# Patient Record
Sex: FEMALE | Race: WHITE | HISPANIC OR LATINO | Employment: UNEMPLOYED | URBAN - METROPOLITAN AREA
[De-identification: names, ages, dates, MRNs, and addresses within clinical notes are randomized per-mention and may not be internally consistent; named-entity substitution may affect disease eponyms.]

---

## 2020-06-10 ENCOUNTER — HOSPITAL ENCOUNTER (EMERGENCY)
Facility: HOSPITAL | Age: 50
Discharge: HOME | End: 2020-06-10
Attending: EMERGENCY MEDICINE
Payer: MEDICAID

## 2020-06-10 VITALS
OXYGEN SATURATION: 98 % | SYSTOLIC BLOOD PRESSURE: 128 MMHG | DIASTOLIC BLOOD PRESSURE: 74 MMHG | HEIGHT: 64 IN | HEART RATE: 76 BPM | RESPIRATION RATE: 18 BRPM | BODY MASS INDEX: 29.02 KG/M2 | WEIGHT: 170 LBS | TEMPERATURE: 98 F

## 2020-06-10 DIAGNOSIS — M79.603 ARM PAIN: ICD-10-CM

## 2020-06-10 DIAGNOSIS — M79.602 LEFT ARM PAIN: ICD-10-CM

## 2020-06-10 DIAGNOSIS — M54.2 CERVICAL PAIN: Primary | ICD-10-CM

## 2020-06-10 LAB
ALBUMIN SERPL-MCNC: 3.8 G/DL (ref 3.3–5.5)
ALP SERPL-CCNC: 78 U/L (ref 42–141)
BILIRUB SERPL-MCNC: 0.5 MG/DL (ref 0.2–1.6)
BUN SERPL-MCNC: 10 MG/DL (ref 7–22)
CALCIUM SERPL-MCNC: 10.4 MG/DL (ref 8–10.3)
CHLORIDE SERPL-SCNC: 105 MMOL/L (ref 98–108)
CREAT SERPL-MCNC: 0.7 MG/DL (ref 0.6–1.2)
GLUCOSE SERPL-MCNC: 106 MG/DL (ref 73–118)
POC ALT (SGPT): 42 U/L (ref 10–47)
POC AST (SGOT): 34 U/L (ref 11–38)
POC B-TYPE NATRIURETIC PEPTIDE: 17.5 PG/ML (ref 0–100)
POC CARDIAC TROPONIN I: 0 NG/ML
POC TCO2: 28 MMOL/L (ref 18–33)
POTASSIUM BLD-SCNC: 4 MMOL/L (ref 3.6–5.1)
PROTEIN, POC: 8.6 G/DL (ref 6.4–8.1)
SAMPLE: NORMAL
SODIUM BLD-SCNC: 143 MMOL/L (ref 128–145)

## 2020-06-10 PROCEDURE — 63600175 PHARM REV CODE 636 W HCPCS: Mod: ER | Performed by: NURSE PRACTITIONER

## 2020-06-10 PROCEDURE — 83880 ASSAY OF NATRIURETIC PEPTIDE: CPT | Mod: ER

## 2020-06-10 PROCEDURE — 93010 EKG 12-LEAD: ICD-10-PCS | Mod: ,,, | Performed by: INTERNAL MEDICINE

## 2020-06-10 PROCEDURE — 25000003 PHARM REV CODE 250: Mod: ER | Performed by: NURSE PRACTITIONER

## 2020-06-10 PROCEDURE — 96372 THER/PROPH/DIAG INJ SC/IM: CPT | Mod: ER,59

## 2020-06-10 PROCEDURE — 96374 THER/PROPH/DIAG INJ IV PUSH: CPT | Mod: ER

## 2020-06-10 PROCEDURE — 85025 COMPLETE CBC W/AUTO DIFF WBC: CPT | Mod: ER

## 2020-06-10 PROCEDURE — 80053 COMPREHEN METABOLIC PANEL: CPT | Mod: ER

## 2020-06-10 PROCEDURE — 99285 EMERGENCY DEPT VISIT HI MDM: CPT | Mod: 25,ER

## 2020-06-10 PROCEDURE — 93010 ELECTROCARDIOGRAM REPORT: CPT | Mod: ,,, | Performed by: INTERNAL MEDICINE

## 2020-06-10 PROCEDURE — 93005 ELECTROCARDIOGRAM TRACING: CPT | Mod: ER

## 2020-06-10 PROCEDURE — 84484 ASSAY OF TROPONIN QUANT: CPT | Mod: ER

## 2020-06-10 RX ORDER — KETOROLAC TROMETHAMINE 30 MG/ML
15 INJECTION, SOLUTION INTRAMUSCULAR; INTRAVENOUS
Status: COMPLETED | OUTPATIENT
Start: 2020-06-10 | End: 2020-06-10

## 2020-06-10 RX ORDER — ASPIRIN 325 MG
325 TABLET ORAL
Status: COMPLETED | OUTPATIENT
Start: 2020-06-10 | End: 2020-06-10

## 2020-06-10 RX ORDER — DICLOFENAC SODIUM 50 MG/1
50 TABLET, DELAYED RELEASE ORAL 3 TIMES DAILY PRN
Qty: 24 TABLET | Refills: 0 | Status: SHIPPED | OUTPATIENT
Start: 2020-06-10

## 2020-06-10 RX ORDER — METFORMIN HYDROCHLORIDE 500 MG/1
500 TABLET ORAL 2 TIMES DAILY WITH MEALS
COMMUNITY

## 2020-06-10 RX ORDER — ORPHENADRINE CITRATE 30 MG/ML
60 INJECTION INTRAMUSCULAR; INTRAVENOUS
Status: COMPLETED | OUTPATIENT
Start: 2020-06-10 | End: 2020-06-10

## 2020-06-10 RX ORDER — METHOCARBAMOL 500 MG/1
1000 TABLET, FILM COATED ORAL EVERY 6 HOURS PRN
Qty: 40 TABLET | Refills: 0 | Status: SHIPPED | OUTPATIENT
Start: 2020-06-10

## 2020-06-10 RX ADMIN — ORPHENADRINE CITRATE 60 MG: 30 INJECTION INTRAMUSCULAR; INTRAVENOUS at 07:06

## 2020-06-10 RX ADMIN — ASPIRIN 325 MG ORAL TABLET 325 MG: 325 PILL ORAL at 06:06

## 2020-06-10 RX ADMIN — KETOROLAC TROMETHAMINE 15 MG: 30 INJECTION, SOLUTION INTRAMUSCULAR at 07:06

## 2020-06-10 NOTE — ED TRIAGE NOTES
Pt to er c/o L upper arm pain that radiates to her shoulder and neck for 2 months--pain has gotten worse and pt reports that she cant lift her arm due to pain--denies trauma and numbness--tingling is present--pulses palpable

## 2020-06-10 NOTE — ED PROVIDER NOTES
Encounter Date: 6/10/2020    SCRIBE #1 NOTE: I, Nathan Mayer, am scribing for, and in the presence of,  GIANNI Altamirano. I have scribed the following portions of the note - Other sections scribed: HPI, ROS, PE.       History     Chief Complaint   Patient presents with    Arm Pain     Pt to ER with c/o left arm pain radiating under left breast. Pt reports + numbness to arm x 2 months. Pt denies injury or fall      This is a nontoxic appearing 50 y.o. female presenting to the ED with left arm pain x 2 months. Patient describes numbness and tingling. Pain is exacerbated with movement. Denies CP and SOB. Pt denies injury. Pt has a hx of DM. She took Motrin with no improvement.     The history is provided by the patient. A  was used (Sister ).   Arm Pain   This is a recurrent problem. The current episode started more than 1 week ago. Pertinent negatives include no chest pain and no shortness of breath. The symptoms are aggravated by bending and twisting. She has tried ASA for the symptoms.     Review of patient's allergies indicates:  No Known Allergies  No past medical history on file.  No past surgical history on file.  No family history on file.  Social History     Tobacco Use    Smoking status: Not on file   Substance Use Topics    Alcohol use: Not on file    Drug use: Not on file     Review of Systems   Constitutional: Negative.    HENT: Negative.    Eyes: Negative.    Respiratory: Negative.  Negative for shortness of breath.    Cardiovascular: Negative.  Negative for chest pain.   Gastrointestinal: Negative.    Endocrine: Negative.    Genitourinary: Negative.    Musculoskeletal: Positive for arthralgias (left arm pain).   Skin: Negative.    Allergic/Immunologic: Negative.    Neurological: Positive for numbness.        + Tingling   Hematological: Negative.    Psychiatric/Behavioral: Negative.    All other systems reviewed and are negative.      Physical Exam     Initial Vitals [06/10/20 1713]    BP Pulse Resp Temp SpO2   (!) 156/88 85 18 97.3 °F (36.3 °C) 97 %      MAP       --         Physical Exam    Nursing note and vitals reviewed.  Constitutional: She appears well-developed.   HENT:   Right Ear: External ear normal.   Left Ear: External ear normal.   Nose: Nose normal.   Mouth/Throat: Oropharynx is clear and moist.   Eyes: Conjunctivae are normal.   Neck: Normal range of motion. Neck supple. Muscular tenderness present. No spinous process tenderness present.       Cardiovascular: Normal rate, regular rhythm, S1 normal, S2 normal and normal heart sounds.   Pulses:       Dorsalis pedis pulses are 2+ on the right side, and 2+ on the left side.   Pulmonary/Chest: Effort normal and breath sounds normal.   Abdominal: Soft. Bowel sounds are normal. She exhibits no distension.   Musculoskeletal: She exhibits no edema.        Left shoulder: She exhibits decreased range of motion and tenderness. She exhibits no swelling and no deformity.        Arms:  Neurological: She is alert and oriented to person, place, and time.   Skin: Skin is warm and dry.   Psychiatric: She has a normal mood and affect. Her behavior is normal.         ED Course   Procedures  Labs Reviewed - No data to display  EKG Readings: (Independently Interpreted)   Initial Reading: No STEMI. Rhythm: Normal Sinus Rhythm. Heart Rate: 67. Ectopy: No Ectopy. Conduction: Normal. Axis: Normal. Clinical Impression: Normal Sinus Rhythm Other Impression: Normal EKG   Normal sinus rhythm       Imaging Results    None        Imaging Results          X-Ray Shoulder 2 or More Views Left (Final result)  Result time 06/10/20 19:03:32    Final result by Sonny Aiken MD (06/10/20 19:03:32)                 Impression:      No acute process.      Electronically signed by: Sonny Aiken MD  Date:    06/10/2020  Time:    19:03             Narrative:    EXAMINATION:  XR SHOULDER COMPLETE 2 OR MORE VIEWS LEFT    CLINICAL HISTORY:  pain;    TECHNIQUE:  Two or three views  of the left shoulder were performed.    COMPARISON:  None    FINDINGS:  The bone mineralization is within normal limits.  There is no cortical step-off.  There is no evidence of periostitis.    The glenohumeral articulation is maintained.  The acromioclavicular joint is within normal limits.  The coracoclavicular interval is unremarkable.    The visualized left hemithorax is within normal limits.  There is no evidence of a pneumothorax.  There is no evidence of pulmonary contusion.    There is no evidence of acute fracture or dislocation of the left shoulder.                               X-Ray Cervical Spine AP And Lateral (Final result)  Result time 06/10/20 19:07:08    Final result by Sonny Aiken MD (06/10/20 19:07:08)                 Impression:      No evidence of acute fracture or listhesis of the cervical spine.    Mild degenerative changes in the cervical spine.  Additional evaluation, as clinically warranted.      Electronically signed by: Sonny Aiken MD  Date:    06/10/2020  Time:    19:07             Narrative:    EXAMINATION:  XR CERVICAL SPINE AP LATERAL    CLINICAL HISTORY:  pain;    TECHNIQUE:  AP, lateral and open mouth views of the cervical spine were performed.    COMPARISON:  None.    FINDINGS:  The craniocervical junction is unremarkable.  The predental space is maintained.  No prevertebral soft tissue swelling is identified.    The cervical alignment is maintained.  The vertebral body heights are maintained.  There is minimal hypertrophy of the posterior elements.  The lateral masses of C1 are poorly visualized.  There is no evidence of acute fracture or listhesis of the cervical spine.    The paraspinal soft tissues are within normal limits.                               X-Ray Chest PA And Lateral (Final result)  Result time 06/10/20 19:05:13   Procedure changed from X-Ray Chest AP Portable     Final result by Sonny Aiken MD (06/10/20 19:05:13)                 Impression:      No acute  process.      Electronically signed by: Sonny Aiken MD  Date:    06/10/2020  Time:    19:05             Narrative:    EXAMINATION:  XR CHEST PA AND LATERAL    CLINICAL HISTORY:  pain; Pain in arm, unspecified    TECHNIQUE:  PA and lateral views of the chest were performed.    COMPARISON:  None    FINDINGS:  The trachea is unremarkable.  The cardiomediastinal silhouette is unremarkable.  The hilar structures are within normal limits.  The hemidiaphragms are unremarkable.  There is no evidence of free air beneath the hemidiaphragms.  There are no pleural effusions.  There is no evidence of a pneumothorax.  There is no evidence of pneumomediastinum.  No airspace opacity is present.  The osseous structures are unremarkable.                                  Medical Decision Making:   History:   Old Medical Records: I decided to obtain old medical records.  Initial Assessment:   This is a nontoxic appearing 50 y.o. female presenting to the ED with left arm pain x 2 months. Denies CP and SOB. Denies injury.   Differential Diagnosis:   Arm sprain, rotator cuff tendinitis, cervical radiculopathy  Clinical Tests:   Lab Tests: Ordered and Reviewed  ED Management:  Physical exam.  EKG normal sinus rhythm.  Toradol 15 mg IV.  Norflex 60 mg IM.  Discharge with diclofenac and robaxin.  Referred to PCP for follow-up in 2 days.             Scribe Attestation:   Scribe #1: I performed the above scribed service and the documentation accurately describes the services I performed. I attest to the accuracy of the note.    This document was produced by a scribe under my direction and in my presence. I agree with the content of the note and have made any necessary edits.     GIANNI Altamirano    06/10/2020 9:02 PM                      Clinical Impression:     1. Cervical pain    2. Arm pain    3. Left arm pain                                   GIANNI Johnson  06/10/20 [id number]

## 2023-05-25 ENCOUNTER — HOSPITAL ENCOUNTER (EMERGENCY)
Facility: HOSPITAL | Age: 53
Discharge: HOME | End: 2023-05-25
Attending: EMERGENCY MEDICINE
Payer: MEDICAID

## 2023-05-25 VITALS
SYSTOLIC BLOOD PRESSURE: 128 MMHG | BODY MASS INDEX: 28 KG/M2 | OXYGEN SATURATION: 99 % | HEART RATE: 72 BPM | HEIGHT: 64 IN | WEIGHT: 164 LBS | TEMPERATURE: 98 F | RESPIRATION RATE: 18 BRPM | DIASTOLIC BLOOD PRESSURE: 72 MMHG

## 2023-05-25 DIAGNOSIS — M47.816 DEGENERATIVE JOINT DISEASE OF CERVICAL AND LUMBAR SPINE: Primary | ICD-10-CM

## 2023-05-25 DIAGNOSIS — R20.2 PARESTHESIA OF LEFT ARM: ICD-10-CM

## 2023-05-25 DIAGNOSIS — M54.9 BACK PAIN: ICD-10-CM

## 2023-05-25 DIAGNOSIS — M47.812 DEGENERATIVE JOINT DISEASE OF CERVICAL AND LUMBAR SPINE: Primary | ICD-10-CM

## 2023-05-25 PROCEDURE — 99284 EMERGENCY DEPT VISIT MOD MDM: CPT | Mod: ER

## 2023-05-25 PROCEDURE — 96372 THER/PROPH/DIAG INJ SC/IM: CPT

## 2023-05-25 PROCEDURE — 63600175 PHARM REV CODE 636 W HCPCS: Mod: ER

## 2023-05-25 RX ORDER — ORPHENADRINE CITRATE 30 MG/ML
30 INJECTION INTRAMUSCULAR; INTRAVENOUS
Status: COMPLETED | OUTPATIENT
Start: 2023-05-25 | End: 2023-05-25

## 2023-05-25 RX ORDER — KETOROLAC TROMETHAMINE 30 MG/ML
30 INJECTION, SOLUTION INTRAMUSCULAR; INTRAVENOUS
Status: COMPLETED | OUTPATIENT
Start: 2023-05-25 | End: 2023-05-25

## 2023-05-25 RX ORDER — TIZANIDINE 2 MG/1
2 TABLET ORAL EVERY 8 HOURS PRN
Qty: 15 TABLET | Refills: 0 | Status: SHIPPED | OUTPATIENT
Start: 2023-05-25

## 2023-05-25 RX ORDER — NAPROXEN 500 MG/1
500 TABLET ORAL 2 TIMES DAILY WITH MEALS
Qty: 10 TABLET | Refills: 0 | Status: SHIPPED | OUTPATIENT
Start: 2023-05-25 | End: 2023-05-30

## 2023-05-25 RX ORDER — ACETAMINOPHEN 500 MG
500 TABLET ORAL EVERY 6 HOURS PRN
Qty: 28 TABLET | Refills: 0 | Status: SHIPPED | OUTPATIENT
Start: 2023-05-25 | End: 2023-06-01

## 2023-05-25 RX ADMIN — KETOROLAC TROMETHAMINE 30 MG: 30 INJECTION, SOLUTION INTRAMUSCULAR; INTRAVENOUS at 04:05

## 2023-05-25 RX ADMIN — ORPHENADRINE CITRATE 30 MG: 60 INJECTION INTRAMUSCULAR; INTRAVENOUS at 04:05

## 2023-05-25 NOTE — ED PROVIDER NOTES
Encounter Date: 5/25/2023    SCRIBE #1 NOTE: I, Rosana Schilling, am scribing for, and in the presence of,  MANN Hardin. I have scribed the following portions of the note - Other sections scribed: MICHAEL CUELLAR.     History     Chief Complaint   Patient presents with    Back Pain     Complains of constant pain across lower back that worsens with movement x10 days. Denies known trauma or injury. Denies vomiting or diarrhea. Denies urinary issues.     53 y.o. female with PMHx of DM who presents to the ED for chief complaint of bilateral lower back pain for 10 days, worsening over the last 3 days. Her pain is exacerbated with walking and position changes. She denies any known trauma. Patient reports taking 800mg Ibuprofen yesterday with no relief of her symptoms. Patient additionally reports difficulty lifting her right leg secondary to pain and left arm numbness. She says that her left arm numbness occurs every time she has back pain. Denies any weakness.    The history is provided by the patient. A  was used ([facility] #[id number]).   Review of patient's allergies indicates:  No Known Allergies  Past Medical History:   Diagnosis Date    Anemia, unspecified     Diabetes mellitus      History reviewed. No pertinent surgical history.  History reviewed. No pertinent family history.  Social History     Tobacco Use    Smoking status: Never    Smokeless tobacco: Never   Substance Use Topics    Alcohol use: Never    Drug use: Never     Review of Systems   Constitutional:  Negative for diaphoresis, fatigue and unexpected weight change.   HENT:  Negative for sinus pain and sore throat.    Eyes:  Negative for pain, redness and visual disturbance.   Respiratory:  Negative for cough, chest tightness, shortness of breath and wheezing.    Cardiovascular:  Negative for chest pain and palpitations.   Gastrointestinal:  Negative for abdominal pain, blood in stool, diarrhea, nausea and vomiting.   Endocrine: Negative for  polydipsia, polyphagia and polyuria.   Genitourinary:  Negative for dysuria, frequency and urgency.   Musculoskeletal:  Positive for back pain. Negative for arthralgias and myalgias.   Skin:  Negative for rash.   Allergic/Immunologic: Negative for environmental allergies.   Neurological:  Positive for numbness (L arm). Negative for dizziness, syncope, weakness and headaches.   Psychiatric/Behavioral:  Negative for suicidal ideas.      Physical Exam     Initial Vitals [05/25/23 1516]   BP Pulse Resp Temp SpO2   130/65 76 16 98.3 °F (36.8 °C) 98 %      MAP       --         Physical Exam    Nursing note and vitals reviewed.  Constitutional: She appears well-developed and well-nourished. She is not diaphoretic. She is cooperative. She does not appear ill. No distress.   HENT:   Head: Normocephalic and atraumatic.   Right Ear: Hearing, tympanic membrane, external ear and ear canal normal.   Left Ear: Hearing, tympanic membrane, external ear and ear canal normal.   Nose: Rhinorrhea present. No mucosal edema or sinus tenderness. Right sinus exhibits no maxillary sinus tenderness and no frontal sinus tenderness. Left sinus exhibits no maxillary sinus tenderness and no frontal sinus tenderness.   Mouth/Throat: Oropharynx is clear and moist and mucous membranes are normal. No posterior oropharyngeal edema, posterior oropharyngeal erythema or tonsillar abscesses.   Eyes: Conjunctivae and EOM are normal. Pupils are equal, round, and reactive to light.   Neck: Neck supple.    Full passive range of motion without pain.     Cardiovascular:  Normal rate, regular rhythm, S1 normal, S2 normal, normal heart sounds and normal pulses.           No murmur heard.  Pulses:       Radial pulses are 2+ on the right side and 2+ on the left side.        Dorsalis pedis pulses are 2+ on the right side and 2+ on the left side.   Pulmonary/Chest: Effort normal and breath sounds normal. No accessory muscle usage. No respiratory distress.    Abdominal: Abdomen is soft and flat. She exhibits no distension. There is no abdominal tenderness.   Musculoskeletal:      Cervical back: Full passive range of motion without pain and neck supple. No edema or rigidity. No muscular tenderness. Normal range of motion.      Thoracic back: Normal.      Lumbar back: Spasms and tenderness present. No bony tenderness. Positive right straight leg raise test. Negative left straight leg raise test.      Comments: Spine palpated from occiput to sacrum.  No midline bony tenderness or bony step-offs.  Spasms and muscle tenderness in the paraspinal musculature of the lumbar back.  Positive straight leg raise on the right.     Neurological: She is alert and oriented to person, place, and time. No cranial nerve deficit or sensory deficit. Gait normal. GCS eye subscore is 4. GCS verbal subscore is 5. GCS motor subscore is 6.   There is minimally reduced strength in the left arm when compared to the right arm on  strength.  However, negative pronator drift test and motor function is intact in bilateral upper and lower extremities.  Sensation is equal and symmetrical in bilateral upper and lower extremities.  Cranial nerves with no focal deficits.   Skin: Skin is warm and dry. Capillary refill takes less than 2 seconds. No abrasion, no lesion and no rash noted.       ED Course   Procedures  Labs Reviewed - No data to display       Imaging Results              X-Ray Cervical Spine AP And Lateral (Final result)  Result time 05/25/23 17:01:33      Final result by Sandip Gerard MD (05/25/23 17:01:33)                   Impression:      1. No acute displaced fracture or dislocation of the cervical spine.      Electronically signed by: Sandip Gerard MD  Date:    05/25/2023  Time:    17:01               Narrative:    EXAMINATION:  XR CERVICAL SPINE AP LATERAL    CLINICAL HISTORY:  Paresthesia of skin    TECHNIQUE:  AP, lateral and open mouth views of the cervical spine were  performed.    COMPARISON:  06/10/2020    FINDINGS:  Four views cervical spine.    Please note, on lateral view, C7 is partially obscured from view by overlying soft tissues.  Allowing for this, lateral imaging demonstrates adequate alignment of the cervical spine without significant vertebral body height loss.  There is disc space height loss involving C5-C6.  The facet joints are aligned.  AP spinal alignment is unremarkable.  The visualized lung apices are clear.  The odontoid is intact.  The lateral masses of C1 are in anatomic relationship with C2.  The paraspinous and hypopharyngeal soft tissues are unremarkable.  The airway is patent.                                       X-Ray Thoracolumbar Spine AP Lateral (Final result)  Result time 05/25/23 17:03:03      Final result by Sandip Gerard MD (05/25/23 17:03:03)                   Impression:      1. No acute displaced fracture or dislocation of the thoracolumbar spine.      Electronically signed by: Sandip Gerard MD  Date:    05/25/2023  Time:    17:03               Narrative:    EXAMINATION:  XR THORACOLUMBAR SPINE AP LATERAL    CLINICAL HISTORY:  Dorsalgia, unspecified    TECHNIQUE:  AP and lateral views of the thoracolumbar spine were performed.    COMPARISON:  None    FINDINGS:  Two views thoracolumbar spine.    Lateral imaging demonstrates adequate alignment of the thoracolumbar spine without significant vertebral body height loss.  There is disc space height loss at L5-S1.  There is lower lumbar facet arthropathy.  AP spinal alignment is remarkable for levo scoliotic curvature of the lumbar spine.  The visualized ribs are intact.                                       Medications   ketorolac injection 30 mg (30 mg Intramuscular Given 5/25/23 1645)   orphenadrine injection 30 mg (30 mg Intramuscular Given 5/25/23 1645)     Medical Decision Making:   History:   Old Medical Records: I decided to obtain old medical records.  Old Records Summarized:  records from clinic visits.       <> Summary of Records: External documents reviewed.  Initial Assessment:   53-year-old female presenting to the emergency department with a chief complaint of back pain for the last 10 days.  No specific inciting incident.  Patient has had these problems in the past but has not been evaluated for them.  On physical exam, patient appears uncomfortable but is in no acute distress.  There is tenderness to palpation in the paraspinal musculature of the low back.  Positive straight leg raise on the right.  Minimally reduced  strength in the left hand when compared to the right.  No other focal neurological deficits.  Differential Diagnosis:   Differential diagnosis includes but is not limited to lumbago with or without sciatica, cervical or lumbar radiculopathy, CVA, TIA low back strain, contusion, fracture, compression fracture, dislocation, cauda equina syndrome, spinal epidural abscess, spinal epidural hematoma  Clinical Tests:   Radiological Study: Reviewed and Ordered  ED Management:  Patient presenting to the emergency department with a chief complaint of back pain.  History and physical exam findings as above.  X-rays of the thoracolumbar and cervical spine with mild disc space narrowing and degenerative joint disease a few joints of the cervical and lumbar spine.  Presentation is consistent with muscle spasm/low back strain with possible radicular component.  Acute pain management in the emergency department with Toradol and Norflex with moderate improvement in symptoms.  Naproxen, Tylenol, and tizanidine were electronically prescribed and sent to the patient's preferred pharmacy.  Referrals were placed for the spine clinic and for physical therapy.    On physical exam, patient had reduced  strength on the left when compared to the right.  However, this was the only neurological deficit noted.  Given the time course, history, duration of symptoms, and pattern of focal  deficit, I have a very low suspicion that this is an acute stroke.  Patient has an NIH stroke score of 0.  In the event that this was a stroke, patient has been having the symptoms for 10 days there would be no benefit to emergent CT scan at this time.  No CT scan of the brain was obtained.    No red flags for cauda equina such as incontinence, urinary retention, saddle anesthesia, weakness, numbness, or lower extremity neurological deficit.  No red flags for infection including fever, IV drug use, or immunocompromised state.  No red flags concerning for cancer such as weight loss, night sweats, pain worse at rest, or spinal tenderness.  Also considered but doubt vertebral fracture, aortic dissection, aortic aneurysm.    Return precautions were discussed, all patient questions were answered, and the patient was agreeable to the plan of care.  She was discharged home in stable condition and will follow up with her primary care provider or return to the emergency department if her symptoms worsen or do not improve.   Additional MDM:     NIH Stroke Scale:   Interval = baseline (upon arrival/admit)  Level of consciousness = 0 - alert  LOC questions = 0 - answers both correctly  LOC commands = 0 - performs both correctly  Best gaze = 0 - normal  Visual = 0 - no visual loss  Facial palsy = 0 - normal  Motor left arm =  0 - no drift  Motor right arm =  0 - no drift  Motor left leg = 0 - no drift  Motor right leg =  0 - no drift  Limb ataxia = 0 - absent  Sensory = 0 - normal  Best language = 0 - no aphasia  Dysarthria = 0 - normal articulation  Extinction and inattention = 0 - no neglect  NIH Stroke Scale Total = 0     Scribe Attestation:   Scribe #1: I performed the above scribed service and the documentation accurately describes the services I performed. I attest to the accuracy of the note.                 I, Jamie Eng PA-C, personally performed the services described in this documentation.  All medical record  entries made by the scribe were at my direction and in my presence.  I have reviewed the chart and agree that the record reflects my personal performance and is accurate and complete.  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 Provider    tiZANidine (ZANAFLEX) 2 MG tablet Take 1 tablet (2 mg total) by mouth every 8 (eight) hours as needed (Muscle spasm). 15 tablet 5/25/2023 -- Jamie Eng PA-C    acetaminophen (TYLENOL) 500 MG tablet Take 1 tablet (500 mg total) by mouth every 6 (six) hours as needed. 28 tablet 5/25/2023 6/1/2023 Jamie Eng PA-C    naproxen (NAPROSYN) 500 MG tablet Take 1 tablet (500 mg total) by mouth 2 (two) times daily with meals. for 5 days 10 tablet 5/25/2023 5/30/2023 Jamie Eng PA-C          Follow-up Information       Follow up With Specialties Details Why Contact Info    [facility]  Schedule an appointment as soon as possible for a visit  As needed [address] [place] [address]  [phone]      [facility] ED Emergency Medicine Go to  If symptoms worsen [address] [facility] [address] [phone]             Jamie Eng PA-C  05/25/23 1735       Jamie Eng PA-C  05/25/23 1730

## 2023-05-25 NOTE — DISCHARGE INSTRUCTIONS
